# Patient Record
Sex: MALE | Race: WHITE | URBAN - METROPOLITAN AREA
[De-identification: names, ages, dates, MRNs, and addresses within clinical notes are randomized per-mention and may not be internally consistent; named-entity substitution may affect disease eponyms.]

---

## 2017-05-15 ENCOUNTER — HOSPITAL ENCOUNTER (OUTPATIENT)
Age: 47
Discharge: HOME | End: 2017-05-15
Attending: FAMILY MEDICINE
Payer: COMMERCIAL

## 2017-05-15 VITALS
DIASTOLIC BLOOD PRESSURE: 81 MMHG | BODY MASS INDEX: 25.18 KG/M2 | HEART RATE: 61 BPM | OXYGEN SATURATION: 98 % | HEIGHT: 73 IN | SYSTOLIC BLOOD PRESSURE: 129 MMHG | TEMPERATURE: 98 F | WEIGHT: 190 LBS | RESPIRATION RATE: 18 BRPM

## 2017-05-15 DIAGNOSIS — L20.9 ATOPIC DERMATITIS, UNSPECIFIED TYPE: Primary | ICD-10-CM

## 2017-05-15 PROCEDURE — 99204 OFFICE O/P NEW MOD 45 MIN: CPT

## 2017-05-15 PROCEDURE — 99213 OFFICE O/P EST LOW 20 MIN: CPT

## 2017-05-15 RX ORDER — PREDNISONE 20 MG/1
40 TABLET ORAL DAILY
Qty: 10 TABLET | Refills: 0 | Status: SHIPPED | OUTPATIENT
Start: 2017-05-15 | End: 2017-05-20

## 2017-05-15 NOTE — ED INITIAL ASSESSMENT (HPI)
Generalized rash since yesterday, itching. Pt denies eating unusual foods, changing detergent, soap etc. Pt denies SOB.

## 2017-05-15 NOTE — ED PROVIDER NOTES
Patient Seen in: [address]    History   Patient presents with:  Rash Skin Problem (integumentary)    Stated Complaint: rash    HPI    Pt complains of an allergic reaction that began 2 days ago.   Patient denies any new e distress, cta bilateral  CARDIO: RRR without murmur  GI: abd soft, ntnd,  EXTREMITIES: moves all 4 spont, no edema, from 5/5 strength  NEURO: alert, oriented x 3, no focal deficits appreciated  SKIN: papular rash on arms neck, feet  PSYCH: calm, cooperativ

## 2017-08-12 ENCOUNTER — OFFICE VISIT (OUTPATIENT)
Dept: FAMILY MEDICINE CLINIC | Facility: CLINIC | Age: 47
End: 2017-08-12

## 2017-08-12 VITALS
HEIGHT: 73 IN | DIASTOLIC BLOOD PRESSURE: 83 MMHG | SYSTOLIC BLOOD PRESSURE: 122 MMHG | TEMPERATURE: 98 F | HEART RATE: 82 BPM | WEIGHT: 199 LBS | BODY MASS INDEX: 26.37 KG/M2

## 2017-08-12 DIAGNOSIS — K64.9 HEMORRHOIDS, UNSPECIFIED HEMORRHOID TYPE: ICD-10-CM

## 2017-08-12 DIAGNOSIS — F32.A DEPRESSION, UNSPECIFIED DEPRESSION TYPE: Primary | ICD-10-CM

## 2017-08-12 PROCEDURE — 99213 OFFICE O/P EST LOW 20 MIN: CPT | Performed by: FAMILY MEDICINE

## 2017-08-12 PROCEDURE — 99212 OFFICE O/P EST SF 10 MIN: CPT | Performed by: FAMILY MEDICINE

## 2017-08-12 RX ORDER — ESCITALOPRAM OXALATE 20 MG/1
20 TABLET ORAL DAILY
Qty: 90 TABLET | Refills: 3 | Status: SHIPPED | OUTPATIENT
Start: 2017-08-12 | End: 2017-09-27

## 2017-08-12 NOTE — PROGRESS NOTES
HPI: Briana Born is a 42year old male who presents for follow-up. States depression is stable. Only has trouble when he forgets to take it. No suicidal thoughts. Pt reports he has had hemorrhoids in the past.  States he has been having trouble again.   Sta

## 2017-09-28 RX ORDER — ESCITALOPRAM OXALATE 20 MG/1
TABLET ORAL
Qty: 30 TABLET | Refills: 2 | Status: SHIPPED | OUTPATIENT
Start: 2017-09-28 | End: 2017-10-02

## 2017-09-29 NOTE — TELEPHONE ENCOUNTER
Refill Protocol Appointment Criteria:Refilled per protocol    · Appointment scheduled in the past 6 months or in the next 3 months  Recent Outpatient Visits            1 month ago Depression, unspecified depression type    [facility], [facility], [address] [place] [address]

## 2017-10-04 RX ORDER — ESCITALOPRAM OXALATE 20 MG/1
TABLET ORAL
Qty: 30 TABLET | Refills: 0 | Status: SHIPPED | OUTPATIENT
Start: 2017-10-04 | End: 2018-12-15

## 2017-12-22 ENCOUNTER — HOSPITAL ENCOUNTER (OUTPATIENT)
Age: 47
Discharge: HOME | End: 2017-12-22
Attending: FAMILY MEDICINE
Payer: COMMERCIAL

## 2017-12-22 VITALS
SYSTOLIC BLOOD PRESSURE: 130 MMHG | DIASTOLIC BLOOD PRESSURE: 75 MMHG | TEMPERATURE: 97 F | RESPIRATION RATE: 16 BRPM | HEART RATE: 66 BPM | OXYGEN SATURATION: 98 %

## 2017-12-22 DIAGNOSIS — H10.9 CONJUNCTIVITIS OF RIGHT EYE, UNSPECIFIED CONJUNCTIVITIS TYPE: Primary | ICD-10-CM

## 2017-12-22 PROCEDURE — 99213 OFFICE O/P EST LOW 20 MIN: CPT

## 2017-12-22 PROCEDURE — 99214 OFFICE O/P EST MOD 30 MIN: CPT

## 2017-12-22 RX ORDER — TOBRAMYCIN AND DEXAMETHASONE 3; 1 MG/ML; MG/ML
2 SUSPENSION/ DROPS OPHTHALMIC
Qty: 5 ML | Refills: 0 | Status: SHIPPED | OUTPATIENT
Start: 2017-12-22 | End: 2017-12-27

## 2017-12-22 NOTE — ED PROVIDER NOTES
Patient Seen in: [facility]    History   Patient presents with:   Eye Visual Problem (opthalmic)    Stated Complaint: PINK EYE    HPI    41year old  Male  patient presents to IC with right eye redness, itching and discha body present in the right eye. Right conjunctiva is injected. Right conjunctiva has no hemorrhage. Right eye exhibits no nystagmus. Right pupil is round and reactive.  Pupils are equal.   Slit lamp exam:       The right eye shows no corneal abrasion, no cor

## 2018-01-25 ENCOUNTER — OFFICE VISIT (OUTPATIENT)
Dept: FAMILY MEDICINE CLINIC | Facility: CLINIC | Age: 48
End: 2018-01-25

## 2018-01-25 VITALS
DIASTOLIC BLOOD PRESSURE: 80 MMHG | WEIGHT: 200 LBS | BODY MASS INDEX: 26 KG/M2 | RESPIRATION RATE: 16 BRPM | HEART RATE: 58 BPM | TEMPERATURE: 98 F | SYSTOLIC BLOOD PRESSURE: 136 MMHG

## 2018-01-25 DIAGNOSIS — N50.89 SCROTAL MASS: Primary | ICD-10-CM

## 2018-01-25 PROCEDURE — 99213 OFFICE O/P EST LOW 20 MIN: CPT | Performed by: FAMILY MEDICINE

## 2018-01-25 PROCEDURE — 99212 OFFICE O/P EST SF 10 MIN: CPT | Performed by: FAMILY MEDICINE

## 2018-01-25 NOTE — PROGRESS NOTES
HPI: Sofie Kearns is a 41year old male who presents for lump in left groin. Mildly tender. Reduces once he lays down. Pt states he has been doing a lot of heavy lifting lately. Pt states he had surgery when he was a baby in the area but he  In unsure why.

## 2018-02-01 ENCOUNTER — OFFICE VISIT (OUTPATIENT)
Dept: SURGERY | Facility: CLINIC | Age: 48
End: 2018-02-01

## 2018-02-01 VITALS — HEIGHT: 73 IN | WEIGHT: 200 LBS | BODY MASS INDEX: 26.51 KG/M2

## 2018-02-01 DIAGNOSIS — K40.90 LEFT INGUINAL HERNIA: Primary | ICD-10-CM

## 2018-02-01 PROCEDURE — 99212 OFFICE O/P EST SF 10 MIN: CPT | Performed by: SURGERY

## 2018-02-01 PROCEDURE — 99244 OFF/OP CNSLTJ NEW/EST MOD 40: CPT | Performed by: SURGERY

## 2018-02-08 NOTE — H&P
History and Physical      Janet Fiore is a 41year old male. HPI   Patient presents with:  Hernia: Patient referred by PCP Dr. Gricelda Dobson for scrotal mass/hernia. Patient states hernia is to left groin, first noticed 1 month ago.  Denies edmar patient.   Constitutional: appears well hydrated alert and responsive no acute distress noted  Head/Face: normocephalic  Nose/Mouth/Throat: nose and throat are clear palate is intact mucous membranes are moist no oral lesions are noted  Neck/Thyroid: neck i

## 2018-02-27 ENCOUNTER — OFFICE VISIT (OUTPATIENT)
Dept: SURGERY | Facility: CLINIC | Age: 48
End: 2018-02-27

## 2018-02-27 DIAGNOSIS — K40.30 INCARCERATED LEFT INGUINAL HERNIA: Primary | ICD-10-CM

## 2018-02-27 PROCEDURE — 99024 POSTOP FOLLOW-UP VISIT: CPT | Performed by: SURGERY

## 2018-02-27 PROCEDURE — 99212 OFFICE O/P EST SF 10 MIN: CPT | Performed by: SURGERY

## 2018-02-27 RX ORDER — HYDROCODONE BITARTRATE AND ACETAMINOPHEN 5; 325 MG/1; MG/1
TABLET ORAL
Refills: 0 | COMMUNITY
Start: 2018-02-16 | End: 2018-12-15 | Stop reason: ALTCHOICE

## 2018-02-27 NOTE — PROGRESS NOTES
Postoperative Patient Follow-up      2/27/2018    Sheri Goad Alyn Holter 41year old      HPI  Patient presents with:  Post-Op: First post op. S/P Repair of incarcerated left inguinal hernia with mesh on 02/16/2018.  Patient experiencing soreness with i

## 2018-03-12 ENCOUNTER — TELEPHONE (OUTPATIENT)
Dept: SURGERY | Facility: CLINIC | Age: 48
End: 2018-03-12

## 2018-03-12 NOTE — TELEPHONE ENCOUNTER
\"No precert required\" per recording at [facility] for procedure on 02/16/2018, reference #[id number].

## 2018-09-22 NOTE — TELEPHONE ENCOUNTER
Refill Protocol Appointment Criteria  · Appointment scheduled in the past 6 months or in the next 3 months  Recent Outpatient Visits            6 months ago Incarcerated left inguinal hernia    [facility] Gerhardt Angelica, MD

## 2018-09-24 RX ORDER — ESCITALOPRAM OXALATE 20 MG/1
TABLET ORAL
Qty: 90 TABLET | Refills: 0 | Status: SHIPPED | OUTPATIENT
Start: 2018-09-24 | End: 2018-12-15

## 2018-12-15 ENCOUNTER — OFFICE VISIT (OUTPATIENT)
Dept: FAMILY MEDICINE CLINIC | Facility: CLINIC | Age: 48
End: 2018-12-15
Payer: COMMERCIAL

## 2018-12-15 VITALS
RESPIRATION RATE: 16 BRPM | BODY MASS INDEX: 26.37 KG/M2 | TEMPERATURE: 98 F | WEIGHT: 199 LBS | HEIGHT: 73 IN | DIASTOLIC BLOOD PRESSURE: 75 MMHG | HEART RATE: 53 BPM | SYSTOLIC BLOOD PRESSURE: 118 MMHG

## 2018-12-15 DIAGNOSIS — K63.5 POLYP OF COLON, UNSPECIFIED PART OF COLON, UNSPECIFIED TYPE: ICD-10-CM

## 2018-12-15 DIAGNOSIS — Z80.0 FAMILY HISTORY OF COLON CANCER: ICD-10-CM

## 2018-12-15 DIAGNOSIS — Z00.00 ROUTINE PHYSICAL EXAMINATION: Primary | ICD-10-CM

## 2018-12-15 DIAGNOSIS — F32.A DEPRESSION, UNSPECIFIED DEPRESSION TYPE: ICD-10-CM

## 2018-12-15 PROCEDURE — 99396 PREV VISIT EST AGE 40-64: CPT | Performed by: FAMILY MEDICINE

## 2018-12-15 PROCEDURE — 90686 IIV4 VACC NO PRSV 0.5 ML IM: CPT | Performed by: FAMILY MEDICINE

## 2018-12-15 PROCEDURE — 90472 IMMUNIZATION ADMIN EACH ADD: CPT | Performed by: FAMILY MEDICINE

## 2018-12-15 PROCEDURE — 90715 TDAP VACCINE 7 YRS/> IM: CPT | Performed by: FAMILY MEDICINE

## 2018-12-15 PROCEDURE — 90471 IMMUNIZATION ADMIN: CPT | Performed by: FAMILY MEDICINE

## 2018-12-15 RX ORDER — ESCITALOPRAM OXALATE 20 MG/1
20 TABLET ORAL
Qty: 90 TABLET | Refills: 3 | Status: SHIPPED | OUTPATIENT
Start: 2018-12-15 | End: 2020-01-23

## 2018-12-15 NOTE — PROGRESS NOTES
HPI:  57 yr old male who presents for physical. Had surgery for left inguinal hernia. No further problems. Changed job and drives more so exercise has gone done. Eating healthy. Had wellness check in September and had bloodwork done.      Last colonoscopy rales or rhonchi  Abd: soft, non-tender, non-distended          Normal bowel sounds; no masses          No hepatosplenomegaly  Extremities: No cyanosis, clubbing, edema. Pedal pulses 2+ joaquín. MSK:  No abnormalities. Skin: Warm/dry/intact.   No abnormal mo

## 2019-03-24 ENCOUNTER — TELEPHONE (OUTPATIENT)
Dept: GASTROENTEROLOGY | Facility: CLINIC | Age: 49
End: 2019-03-24

## 2019-03-24 NOTE — TELEPHONE ENCOUNTER
Discussed with Ry Simental (midwife at [facility]) --- her  is due for c-scope this year (last c-scope in 2014), family hx of CRC in mother.       GI Clinical Staff:   Please call Briana Born and schedule colonoscopy with IV sedation, split dose (suprep) can be pende

## 2019-03-28 NOTE — TELEPHONE ENCOUNTER
CBLM to schedule procedure. Please transfer to Abdulaziz Fabian at ext [phone] or [phone] for scheduling. Or please transfer to [facility] in GI if unavailable.

## 2020-01-23 RX ORDER — ESCITALOPRAM OXALATE 20 MG/1
TABLET ORAL
Qty: 90 TABLET | Refills: 3 | Status: SHIPPED | OUTPATIENT
Start: 2020-01-23

## 2020-01-23 NOTE — TELEPHONE ENCOUNTER
CSS- please schedule pt appt and task back. Thanks. Please review; protocol failed.     Refill Protocol Appointment Criteria  · Appointment scheduled in the past 6 months or in the next 3 months  Recent Outpatient Visits            1 year ago Routine physi

## 2022-03-05 ENCOUNTER — OFFICE VISIT (OUTPATIENT)
Dept: FAMILY MEDICINE CLINIC | Facility: CLINIC | Age: 52
End: 2022-03-05
Payer: COMMERCIAL

## 2022-03-05 VITALS
HEART RATE: 84 BPM | HEIGHT: 73 IN | BODY MASS INDEX: 26.24 KG/M2 | TEMPERATURE: 98 F | WEIGHT: 198 LBS | DIASTOLIC BLOOD PRESSURE: 75 MMHG | SYSTOLIC BLOOD PRESSURE: 118 MMHG

## 2022-03-05 DIAGNOSIS — Z00.00 ROUTINE PHYSICAL EXAMINATION: Primary | ICD-10-CM

## 2022-03-05 DIAGNOSIS — Z80.0 FAMILY HISTORY OF COLON CANCER: ICD-10-CM

## 2022-03-05 DIAGNOSIS — F33.42 RECURRENT MAJOR DEPRESSIVE DISORDER, IN FULL REMISSION (HCC): ICD-10-CM

## 2022-03-05 LAB
ALBUMIN SERPL-MCNC: 4 G/DL (ref 3.4–5)
ALBUMIN/GLOB SERPL: 1.1 {RATIO} (ref 1–2)
ALP LIVER SERPL-CCNC: 69 U/L
ALT SERPL-CCNC: 26 U/L
ANION GAP SERPL CALC-SCNC: 9 MMOL/L (ref 0–18)
AST SERPL-CCNC: 16 U/L (ref 15–37)
BILIRUB SERPL-MCNC: 0.5 MG/DL (ref 0.1–2)
BUN BLD-MCNC: 19 MG/DL (ref 7–18)
BUN/CREAT SERPL: 17.6 (ref 10–20)
CALCIUM BLD-MCNC: 9.7 MG/DL (ref 8.5–10.1)
CHLORIDE SERPL-SCNC: 109 MMOL/L (ref 98–112)
CHOLEST SERPL-MCNC: 172 MG/DL (ref ?–200)
CO2 SERPL-SCNC: 25 MMOL/L (ref 21–32)
CREAT BLD-MCNC: 1.08 MG/DL
DEPRECATED RDW RBC AUTO: 42.2 FL (ref 35.1–46.3)
ERYTHROCYTE [DISTWIDTH] IN BLOOD BY AUTOMATED COUNT: 12.9 % (ref 11–15)
EST. AVERAGE GLUCOSE BLD GHB EST-MCNC: 97 MG/DL (ref 68–126)
FASTING PATIENT LIPID ANSWER: NO
FASTING STATUS PATIENT QL REPORTED: NO
GLOBULIN PLAS-MCNC: 3.5 G/DL (ref 2.8–4.4)
GLUCOSE BLD-MCNC: 101 MG/DL (ref 70–99)
HBA1C MFR BLD: 5 % (ref ?–5.7)
HCT VFR BLD AUTO: 44.4 %
HDLC SERPL-MCNC: 57 MG/DL (ref 40–59)
HGB BLD-MCNC: 15.3 G/DL
LDLC SERPL CALC-MCNC: 84 MG/DL (ref ?–100)
MCH RBC QN AUTO: 31 PG (ref 26–34)
MCHC RBC AUTO-ENTMCNC: 34.5 G/DL (ref 31–37)
MCV RBC AUTO: 90.1 FL
NONHDLC SERPL-MCNC: 115 MG/DL (ref ?–130)
OSMOLALITY SERPL CALC.SUM OF ELEC: 298 MOSM/KG (ref 275–295)
PLATELET # BLD AUTO: 169 10(3)UL (ref 150–450)
POTASSIUM SERPL-SCNC: 3.9 MMOL/L (ref 3.5–5.1)
PROT SERPL-MCNC: 7.5 G/DL (ref 6.4–8.2)
RBC # BLD AUTO: 4.93 X10(6)UL
SODIUM SERPL-SCNC: 143 MMOL/L (ref 136–145)
TRIGL SERPL-MCNC: 185 MG/DL (ref 30–149)
TSI SER-ACNC: 1.12 MIU/ML (ref 0.36–3.74)
VLDLC SERPL CALC-MCNC: 29 MG/DL (ref 0–30)
WBC # BLD AUTO: 4.9 X10(3) UL (ref 4–11)

## 2022-03-05 PROCEDURE — 3074F SYST BP LT 130 MM HG: CPT | Performed by: FAMILY MEDICINE

## 2022-03-05 PROCEDURE — 3008F BODY MASS INDEX DOCD: CPT | Performed by: FAMILY MEDICINE

## 2022-03-05 PROCEDURE — 99386 PREV VISIT NEW AGE 40-64: CPT | Performed by: FAMILY MEDICINE

## 2022-03-05 PROCEDURE — 90686 IIV4 VACC NO PRSV 0.5 ML IM: CPT | Performed by: FAMILY MEDICINE

## 2022-03-05 PROCEDURE — 90471 IMMUNIZATION ADMIN: CPT | Performed by: FAMILY MEDICINE

## 2022-03-05 PROCEDURE — 3078F DIAST BP <80 MM HG: CPT | Performed by: FAMILY MEDICINE

## 2022-03-05 RX ORDER — ESCITALOPRAM OXALATE 20 MG/1
20 TABLET ORAL DAILY
Qty: 90 TABLET | Refills: 3 | Status: SHIPPED | OUTPATIENT
Start: 2022-03-05

## 2022-05-05 ENCOUNTER — OFFICE VISIT (OUTPATIENT)
Dept: GASTROENTEROLOGY | Facility: CLINIC | Age: 52
End: 2022-05-05
Payer: COMMERCIAL

## 2022-05-05 ENCOUNTER — TELEPHONE (OUTPATIENT)
Dept: GASTROENTEROLOGY | Facility: CLINIC | Age: 52
End: 2022-05-05

## 2022-05-05 VITALS
SYSTOLIC BLOOD PRESSURE: 118 MMHG | DIASTOLIC BLOOD PRESSURE: 67 MMHG | HEIGHT: 73 IN | BODY MASS INDEX: 26.77 KG/M2 | WEIGHT: 202 LBS | HEART RATE: 58 BPM

## 2022-05-05 DIAGNOSIS — Z80.0 FAMILY HISTORY OF COLON CANCER: ICD-10-CM

## 2022-05-05 PROCEDURE — 3008F BODY MASS INDEX DOCD: CPT | Performed by: INTERNAL MEDICINE

## 2022-05-05 PROCEDURE — 3074F SYST BP LT 130 MM HG: CPT | Performed by: INTERNAL MEDICINE

## 2022-05-05 PROCEDURE — 3078F DIAST BP <80 MM HG: CPT | Performed by: INTERNAL MEDICINE

## 2022-05-05 PROCEDURE — S0285 CNSLT BEFORE SCREEN COLONOSC: HCPCS | Performed by: INTERNAL MEDICINE

## 2022-05-05 NOTE — TELEPHONE ENCOUNTER
Scheduled for: Colonoscopy [address]   Provider Name: Dr Ernst Ott  Date: Mon 7/11/2022  Location: [facility]   Sedation: MAC  Time: 2 pm, (pt is aware that [facility] will call the day before to confirm arrival time)  Prep: split golyte   Meds/Allergies Reconciled?: NKDA    Diagnosis with codes: CRC screening Z12.11    Was patient informed to call insurance with codes (Y/N): Yes   Referral sent?: Yes   [facility] or [address] [place] [address] notified?: Electronic case request was sent to [facility] via [employer]. Medication Orders: Pt is aware to NOT take iron pills, herbal meds and diet supplements for 7 days before exam. Also to NOT take any form of alcohol, recreational drugs and any forms of ED meds 24 hours before exam.      Misc Orders:  Patient was informed that they will need a COVID 19 test prior to their procedure. Patient verbally understood & will await a phone call from [facility] to schedule. Further instructions given by staff:   Instructions given and pt verbalized understanding

## 2022-05-05 NOTE — PATIENT INSTRUCTIONS
1. Schedule colonoscopy with MA [Diagnosis: screening]    2.  bowel prep from pharmacy (split Golytely)    3. Continue all medications for procedure    4. Read all bowel prep instructions carefully    5. AVOID seeds, nuts, popcorn, raw fruits and vegetables (cooked is okay) for 2-3 days before procedure    6. You MAY need to go for COVID testing 72 hours before procedure. The testing team will call you a few days before your procedure to discuss with you if testing is required. If you are asked to go for COVID testing and do not completed the test, the procedure cannot be performed. 7. If you start any NEW medication after your visit today, please notify us. Certain medications will need to be held before the procedure, or the procedure cannot be performed.

## 2022-06-29 RX ORDER — ESCITALOPRAM OXALATE 20 MG/1
20 TABLET ORAL DAILY
Qty: 90 TABLET | Refills: 3 | Status: SHIPPED | OUTPATIENT
Start: 2022-06-29

## 2022-07-10 ENCOUNTER — TELEPHONE (OUTPATIENT)
Dept: GASTROENTEROLOGY | Facility: CLINIC | Age: 52
End: 2022-07-10

## 2022-07-10 NOTE — TELEPHONE ENCOUNTER
The patient contacted me as the on-call physician. He is scheduled for a colonoscopy tomorrow by Dr. Meagan Lees but was uncertain if he is still on the schedule as he did not receive a call. It does appear that he is on the schedule tentatively at 2 PM with an arrival time of 1 PM.  If there are any changes he will be contacted tomorrow. The patient has the bowel preparation at home and instructions. Prince Saxena.

## 2022-07-11 ENCOUNTER — SURGERY CENTER DOCUMENTATION (OUTPATIENT)
Dept: SURGERY | Age: 52
End: 2022-07-11

## 2022-07-11 DIAGNOSIS — K63.5 POLYP OF COLON, UNSPECIFIED PART OF COLON, UNSPECIFIED TYPE: ICD-10-CM

## 2022-07-11 NOTE — TELEPHONE ENCOUNTER
D/w patient--->explaind to him that his procedure can be done earlier. He will arrive this morning to [facility] as soon as he is done prepping. Likely at noon he says.

## 2022-07-18 ENCOUNTER — TELEPHONE (OUTPATIENT)
Dept: GASTROENTEROLOGY | Facility: CLINIC | Age: 52
End: 2022-07-18

## 2022-07-18 NOTE — TELEPHONE ENCOUNTER
Dr. Clare Zavaleta:  I attached results below for your review from Eleonora Downing. RN:  Please print hard copy to be scanned into chart.     Thank you

## 2022-07-19 ENCOUNTER — TELEPHONE (OUTPATIENT)
Dept: GASTROENTEROLOGY | Facility: CLINIC | Age: 52
End: 2022-07-19

## 2022-07-19 NOTE — TELEPHONE ENCOUNTER
I mailed out colonoscopy results letter to pt  Updated health maintenance  Entered into 5 yr CLN recall  Recall colon in 5 years per.  Colon done 7/11/2022      Tashi Fernandez MD  P Em Gi Clinical Staff  GI staff: please place recall for colonoscopy in 5 years

## 2022-09-08 ENCOUNTER — NURSE TRIAGE (OUTPATIENT)
Dept: FAMILY MEDICINE CLINIC | Facility: CLINIC | Age: 52
End: 2022-09-08

## 2022-09-08 ENCOUNTER — PATIENT MESSAGE (OUTPATIENT)
Dept: FAMILY MEDICINE CLINIC | Facility: CLINIC | Age: 52
End: 2022-09-08

## 2022-09-08 DIAGNOSIS — F32.A DEPRESSION, UNSPECIFIED DEPRESSION TYPE: Primary | ICD-10-CM

## 2022-09-08 NOTE — TELEPHONE ENCOUNTER
Please see telephone encounter. Dr. Chen Piper spoke to patient who agreed to go to [facility] for an evaluation.

## 2022-09-08 NOTE — TELEPHONE ENCOUNTER
----- Message from Josefa Stephenson RN sent at 9/8/2022 11:04 AM CDT -----  Regarding: FW: Referral request      ----- Message -----  From: Corinne Trammell  Sent: 9/8/2022   9:30 AM CDT  To: Em Rn Triage  Subject: Referral request                                 Hi Dr Navneet Hdz,    Would it be possible for you to refer me to some mental health support? I am not doing too well these days and I think I need help.      Kind regards    Sary Bishop

## 2023-01-11 NOTE — TELEPHONE ENCOUNTER
Lana here for Prolia injection today. Patient denies any concerns with previous injections. See MAR for injection details. Patient tolerated procedure well. Patient discharged in stable, ambulatory condition..     Pt states that he is having crisis. Wife is having affair. Feels like he needs support to get through it. Pt states he has more thoughts about hurting himself. Having trouble sleeping. Having trouble sleeping. Has been going on for one week. No plan to injure himself. Referred to the [facility].  Pt will proceed there now

## 2023-01-14 ENCOUNTER — HOSPITAL ENCOUNTER (OUTPATIENT)
Age: 53
Discharge: HOME | End: 2023-01-14
Payer: COMMERCIAL

## 2023-01-14 PROCEDURE — 90471 IMMUNIZATION ADMIN: CPT

## 2023-08-23 ENCOUNTER — OFFICE VISIT (OUTPATIENT)
Dept: FAMILY MEDICINE CLINIC | Facility: CLINIC | Age: 53
End: 2023-08-23

## 2023-08-23 VITALS
TEMPERATURE: 98 F | RESPIRATION RATE: 16 BRPM | BODY MASS INDEX: 25.58 KG/M2 | HEART RATE: 65 BPM | WEIGHT: 193 LBS | SYSTOLIC BLOOD PRESSURE: 144 MMHG | DIASTOLIC BLOOD PRESSURE: 80 MMHG | HEIGHT: 73 IN

## 2023-08-23 DIAGNOSIS — Z00.00 ROUTINE PHYSICAL EXAMINATION: Primary | ICD-10-CM

## 2023-08-23 DIAGNOSIS — R03.0 ELEVATED BP WITHOUT DIAGNOSIS OF HYPERTENSION: ICD-10-CM

## 2023-08-23 DIAGNOSIS — R20.0 LEFT ARM NUMBNESS: ICD-10-CM

## 2023-08-23 DIAGNOSIS — M54.2 NECK PAIN: ICD-10-CM

## 2023-08-23 PROCEDURE — 3079F DIAST BP 80-89 MM HG: CPT | Performed by: FAMILY MEDICINE

## 2023-08-23 PROCEDURE — 3008F BODY MASS INDEX DOCD: CPT | Performed by: FAMILY MEDICINE

## 2023-08-23 PROCEDURE — 99396 PREV VISIT EST AGE 40-64: CPT | Performed by: FAMILY MEDICINE

## 2023-08-23 PROCEDURE — 3077F SYST BP >= 140 MM HG: CPT | Performed by: FAMILY MEDICINE

## 2023-08-23 RX ORDER — METHYLPREDNISOLONE 4 MG/1
TABLET ORAL
Qty: 1 EACH | Refills: 0 | Status: SHIPPED | OUTPATIENT
Start: 2023-08-23

## 2024-04-23 RX ORDER — ESCITALOPRAM OXALATE 20 MG/1
20 TABLET ORAL DAILY
Qty: 90 TABLET | Refills: 0 | Status: SHIPPED | OUTPATIENT
Start: 2024-04-23 | End: 2024-07-02

## 2024-04-23 NOTE — TELEPHONE ENCOUNTER
CSS please call patient to assist with scheduling appointment with PCP    Protocol failed for appointment only  90 day refill given on 04/23/24, appointment needed for further refills.    No future appointments.    Requested Prescriptions   Pending Prescriptions Disp Refills    ESCITALOPRAM 20 MG Oral Tab [Pharmacy Med Name: Escitalopram Oxalate 20 MG Oral Tablet] 90 tablet 3     Sig: TAKE 1 TABLET BY MOUTH DAILY       Psychiatric Non-Scheduled (Anti-Anxiety) Failed - 4/21/2024  9:25 PM        Failed - In person appointment or virtual visit in the past 6 mos or appointment in next 3 mos     Recent Outpatient Visits              8 months ago Routine physical examination    [facility] Col[facility], DO    Office Visit    1 year ago Family history of colon cancer    [facility] IMANI Kwok MD    Office Visit    2 years ago Routine physical examination    [facility] Colleen M, DO    Office Visit    5 years ago Routine physical examination    [facility] Weiler, Colleen M, DO    Office Visit    6 years ago [facility]arcer[facility] inguinal [facility]    [facility]                      Passed - Depression Screening completed within the past 12 months              Recent Outpatient Visits              8 months ago Routine physical examination    [facility] Colleen M,     Office Visit    1 year ago [facility] history of colon cancer    [facility]    Office Visit    2 years ago Routine physical examination    [facility] Col[facility] M,     Office Visit    5 years ago Routine physical examination    [facility], [facility] Weiler, Colleen M,     Office Visit    6 years ago Incarcerated left inguinal hernia    [facility], [facility], [place] Peter Harrell MD    Office Visit

## 2024-04-25 NOTE — TELEPHONE ENCOUNTER
2nd attempt/first below. Left voice mail message for patient to call back. Please, see message below when the call is returned.

## 2024-07-02 ENCOUNTER — TELEPHONE (OUTPATIENT)
Dept: FAMILY MEDICINE CLINIC | Facility: CLINIC | Age: 54
End: 2024-07-02

## 2024-07-02 RX ORDER — ESCITALOPRAM OXALATE 20 MG/1
20 TABLET ORAL DAILY
Qty: 30 TABLET | Refills: 0 | Status: SHIPPED | OUTPATIENT
Start: 2024-07-02

## 2024-07-02 NOTE — TELEPHONE ENCOUNTER
Patient, states that he is on vacation and forgot to take his medication with him. Patient would like to know if the doctor can send in a script to the local pharmacy for a week supply of his escitalopram medication. Pharmacy: [facility]: address: [address] [place], [place] Phone: [phone] fax: not provided.     Current Outpatient Medications   Medication Sig Dispense Refill    escitalopram 20 MG Oral Tab Take 1 tablet (20 mg total) by mouth daily. **Appointment needed for further refills. 90 tablet 0

## 2024-07-20 ENCOUNTER — TELEPHONE (OUTPATIENT)
Dept: FAMILY MEDICINE CLINIC | Facility: CLINIC | Age: 54
End: 2024-07-20

## 2024-07-24 RX ORDER — ESCITALOPRAM OXALATE 20 MG/1
20 TABLET ORAL DAILY
Qty: 30 TABLET | Refills: 0 | Status: SHIPPED | OUTPATIENT
Start: 2024-07-24

## 2024-07-24 NOTE — TELEPHONE ENCOUNTER
Please review; protocol failed/ has no protocol      Routing to pod mate as Dr.Weiler is out of office     Message sent for patient to make an appointment.     Requested Prescriptions   Pending Prescriptions Disp Refills    ESCITALOPRAM 20 MG Oral Tab [Pharmacy Med Name: Escitalopram Oxalate 20 MG Oral Tablet] 90 tablet 3     Sig: TAKE 1 TABLET BY MOUTH DAILY       Psychiatric Non-Scheduled (Anti-Anxiety) Failed - 7/20/2024  9:54 PM        Failed - In person appointment or virtual visit in the past 6 mos or appointment in next 3 mos     Recent Outpatient Visits              11 months ago Routine physical examination    [facility] Weiler, Colleen M,     Office Visit    2 years ago Family history of colon cancer    [facility] IMANI Kwok MD    Office Visit    2 years ago Routine physical examination    [facility] Col[facility] M,     Office Visit    5 years ago Routine physical examination    [facility] Weiler, Colleen M,     Office Visit    6 years ago [facility]arcer[facility] inguinal [facility]    [facility]                      Passed - Depression Screening completed within the past 12 months           Recent Outpatient Visits              11 months ago Routine physical examination    [facility] Colleen M,     Office Visit    2 years ago [facility] of colon [facility]    [facility]    Office Visit    2 years ago Routine physical examination    [facility] Colleen M,     Office Visit    5 years ago Routine physical examination    [facility] Col[facility] M, DO     Office Visit    6 years ago Incarcerated left inguinal hernia    [facility], [facility], [place] Peter Harrell MD    Office Visit

## 2024-07-26 RX ORDER — ESCITALOPRAM OXALATE 20 MG/1
20 TABLET ORAL DAILY
Qty: 30 TABLET | Refills: 0 | OUTPATIENT
Start: 2024-07-26

## 2024-08-20 RX ORDER — ESCITALOPRAM OXALATE 20 MG/1
20 TABLET ORAL DAILY
Qty: 30 TABLET | Refills: 11 | OUTPATIENT
Start: 2024-08-20

## 2024-08-20 NOTE — TELEPHONE ENCOUNTER
Please review refill failed/no protocol - 30 day supply given last month. Several outreaches made to schedule visit     Requested Prescriptions     Pending Prescriptions Disp Refills    ESCITALOPRAM 20 MG Oral Tab [Pharmacy Med Name: Escitalopram Oxalate 20 MG Oral Tablet] 30 tablet 11     Sig: TAKE 1 TABLET BY MOUTH DAILY         Recent Visits  Date Type Provider Dept   08/23/23 Office Visit Weiler, Colleen M, DO [facility]   Showing recent visits within past 540 days with a meds authorizing provider and meeting all other requirements  Future Appointments  No visits were found meeting these conditions.  Showing future appointments within next 150 days with a meds authorizing provider and meeting all other requirements    Requested Prescriptions   Pending Prescriptions Disp Refills    ESCITALOPRAM 20 MG Oral Tab [Pharmacy Med Name: Escitalopram Oxalate 20 MG Oral Tablet] 30 tablet 11     Sig: TAKE 1 TABLET BY MOUTH DAILY       Psychiatric Non-Scheduled (Anti-Anxiety) Failed - 8/16/2024  9:21 PM        Failed - In person appointment or virtual visit in the past 6 mos or appointment in next 3 mos     Recent Outpatient Visits              12 months ago Routine physical examination    [facility] Weiler, Colleen M,     Office Visit    2 years ago Family history of colon cancer    [facility] IMANI Kwok MD    Office Visit    2 years ago Routine physical examination    [facility] Weiler, Colleen M,     Office Visit    5 years ago Routine physical examination    [facility] Weiler, Colleen M,     Office Visit    6 years ago [facility]arcer[facility] inguinal hernia    [facility]    [facility]                      Passed - Depression Screening completed within the past 12 months

## 2025-02-04 NOTE — TELEPHONE ENCOUNTER
I'm not even sure he is taking at as the last refill was 7/24 and I only gave him 30 pills.     Probably best to wait for his appointment or we can do a video visit ahead of time to check in

## 2025-02-04 NOTE — TELEPHONE ENCOUNTER
Multiple reachouts have been made since 4/23/2024. Patient has gotten many courtesy refills on this medications.    Patient is scheduled for 4/9/25, please advise if any further refills are appropriate at this time or if patient needs to wait until appointment.    Patient was last provided a 30 day supply 7/24/24, non-compliant.    Please kindly review, medication fails/has no protocol attached.    [x] FAILS    [] NO PROTOCOL ATTACHED    Future Appointments   Date Time Provider Department Center   4/9/2025  6:15 PM Weiler, Colleen M, DO [facility]     Last office visit: 8/23/2023    Requested Prescriptions   Pending Prescriptions Disp Refills    escitalopram 20 MG Oral Tab 30 tablet 0     Sig: Take 1 tablet (20 mg total) by mouth daily.       Psychiatric Non-Scheduled (Anti-Anxiety) Failed - 2/4/2025 12:54 PM        Failed - Depression Screening completed within the past 12 months        Passed - In person appointment or virtual visit in the past 6 mos or appointment in next 3 mos     Recent Outpatient Visits              1 year ago Routine physical examination    [facility] Weiler, Colleen M,     Office Visit    2 years ago Family history of colon cancer    [facility] IMANI Kwok MD    Office Visit    2 years ago Routine physical examination    [facility] Weiler, Colleen M, DO    Office Visit    6 years ago Routine physical examination    [facility] Weiler, Colleen M,     Office Visit    6 years ago [facility]arcer[facility] inguinal hernia    [facility]    Office Visit          Future Appointments         Provider Department Appt Notes    In 2 months Weiler, Colleen M, DO [facility] physical 8..23.23                    Passed -  Medication is active on med list

## 2025-05-08 NOTE — PROGRESS NOTES
HPI:  54 yr old male who presents for physical. Moving back to [place]. Will be working for [employer] remotely. Has 2 daughters.  Christine lives in [place] and Sabas lives here. Goes to [facility]. Ex wife lives in [place]. Exercising regularly. Eating healthy. Has gained 5 pounds.     Started vaping Nicotine 2 months ago.     Depression is stable on Escitalopram.     PMHx: reviewed, see chart  PSHx: reviewed, see chart  All: Patient has no known allergies.   Meds: see chart    ROS:   Allergic/Immuno:  Negative for environmental allergies and food allergies  Cardiovascular:  Negative for chest pain and irregular heartbeat/palpitations  Constitutional:  Negative for decreased activity, fever, irritability and lethargy  Endocrine:  Negative for abnormal sleep patterns, increased activity, polydipsia and polyphagia  ENMT:  Negative for ear drainage, hearing loss and nasal drainage  Eyes:  Negative for eye discharge and vision loss  Gastrointestinal:  Negative for abdominal pain, constipation, decreased appetite, diarrhea and vomiting  Genitourinary:  Negative for dysuria and hematuria  Hema/Lymph:  Negative for easy bleeding and easy bruising  Integumentary:  Negative for pruritus and rash  Musculoskeletal:  Negative for bone/joint symptoms  Neurological:  Negative for gait disturbance  Psychiatric:  Negative for inappropriate interaction and psychiatric symptoms    PE:  /90   Pulse 60   Temp 97.2 °F (36.2 °C) (Temporal)   Resp 16   Ht 6' 1\" (1.854 m)   Wt 198 lb (89.8 kg)   BMI 26.12 kg/m²   Gen:  Well-nourished.  No distress.  HEENT: Conjunctive clear.  Juan ear canals clear.  Juan TMs intact with good landmarks noted.  Nares patent.  Oral mucous membrane moist.  Normal lips, teeth, and gums.  Oropharynx normal.  Neck supple.  Good ROM.  No LAD.  Thyroid normal.  CV:  Regular rate and rhythm; no murmurs  Lungs:  Clear to ausculation; good aeration               No wheezes, rales or rhonchi  Abd: soft, non-tender,  non-distended          Normal bowel sounds; no masses          No hepatosplenomegaly  Extremities: No cyanosis, clubbing, edema.  Pedal pulses 2+ joaquín.  Psych: Orientated to person, place, and time.  Behavior and affect appropriate for age.    A/P:  Encounter Diagnoses   Name Primary?    Routine physical examination    Encouraged exercise and healthy diet.  Labs done.  Will return for vaccines.  Encouraged pt to stop vaping.  Will contribute to high blood pressure. Pt is moving to [place].    Yes    Recurrent major depressive disorder, in full remission    Stable on Escitalopram.             Encounter for tobacco use cessation counseling    Encouraged cessation.     Colleen Weiler, DO

## 2025-05-31 NOTE — TELEPHONE ENCOUNTER
Patient calling to state the medication filled 4/28/2025 was  not picked up and now no longer at [facility].     Patient requesting medication to be filled now, and will  at a different [facility] in [place], since he will be out of town    escitalopram 20 MG Oral Tab     [facility], [address] [place] [address], [place] [address]; phone [phone]

## 2025-06-02 NOTE — TELEPHONE ENCOUNTER
Please review; protocol failed/No Protocol      Sending what is left on script per protocol patient requesting different pharmacy

## 2025-06-02 NOTE — TELEPHONE ENCOUNTER
Escitalopram 20m year supply sent to [facility] in [place] on 2025    Patient requesting different pharmacy

## (undated) DIAGNOSIS — Z12.11 SCREEN FOR COLON CANCER: Primary | ICD-10-CM

## (undated) NOTE — LETTER
No referring provider defined for this encounter.        02/27/18        Patient: Joaquin Vaca   YOB: 1970   Date of Visit: 2/27/2018       Dear  Dr. Alba Collins,      Thank you for referring Yolande Hernandez to my

## (undated) NOTE — ED AVS SNAPSHOT
[facility] [place] [address]    Phone:  [phone]           Mr. Mario العلي   MRN: [id number]    Department:  [facility]   Date of Visit:  5/15/ benefit level being available to you or other limited reimbursement. The physician may seek payment directly from you for amounts other than your deductible, co-payment, or co-insurance and for other services not covered under your health insurance plan. If you believe that any of the medications or instructions on this list is different from what your Primary Care doctor has instructed you - please continue to take your medications as instructed by your Primary Care doctor until you can check with your do Patient [address] to help you get signed up for insurance coverage. Patient [address] is a Federal Navigator program that can help with your Affordable Care Act coverage, as well as all types of Medicaid plans.   To get signed up and covere